# Patient Record
Sex: MALE | Race: WHITE | NOT HISPANIC OR LATINO
[De-identification: names, ages, dates, MRNs, and addresses within clinical notes are randomized per-mention and may not be internally consistent; named-entity substitution may affect disease eponyms.]

---

## 2018-04-27 PROBLEM — Z00.00 ENCOUNTER FOR PREVENTIVE HEALTH EXAMINATION: Status: ACTIVE | Noted: 2018-04-27

## 2018-05-09 ENCOUNTER — APPOINTMENT (OUTPATIENT)
Dept: ORTHOPEDIC SURGERY | Facility: CLINIC | Age: 53
End: 2018-05-09

## 2018-05-14 ENCOUNTER — APPOINTMENT (OUTPATIENT)
Dept: ORTHOPEDIC SURGERY | Facility: CLINIC | Age: 53
End: 2018-05-14
Payer: COMMERCIAL

## 2018-05-14 VITALS — HEIGHT: 71 IN | BODY MASS INDEX: 26.6 KG/M2 | WEIGHT: 190 LBS

## 2018-05-14 DIAGNOSIS — Z86.39 PERSONAL HISTORY OF OTHER ENDOCRINE, NUTRITIONAL AND METABOLIC DISEASE: ICD-10-CM

## 2018-05-14 DIAGNOSIS — Z78.9 OTHER SPECIFIED HEALTH STATUS: ICD-10-CM

## 2018-05-14 PROCEDURE — 99204 OFFICE O/P NEW MOD 45 MIN: CPT

## 2018-05-14 PROCEDURE — 73610 X-RAY EXAM OF ANKLE: CPT | Mod: RT

## 2018-07-13 ENCOUNTER — APPOINTMENT (OUTPATIENT)
Dept: ORTHOPEDIC SURGERY | Facility: CLINIC | Age: 53
End: 2018-07-13
Payer: COMMERCIAL

## 2018-07-13 ENCOUNTER — APPOINTMENT (OUTPATIENT)
Dept: VASCULAR SURGERY | Facility: CLINIC | Age: 53
End: 2018-07-13
Payer: COMMERCIAL

## 2018-07-13 VITALS
SYSTOLIC BLOOD PRESSURE: 134 MMHG | DIASTOLIC BLOOD PRESSURE: 87 MMHG | HEART RATE: 76 BPM | WEIGHT: 192 LBS | BODY MASS INDEX: 26.88 KG/M2 | HEIGHT: 71 IN

## 2018-07-13 DIAGNOSIS — I83.90 ASYMPTOMATIC VARICOSE VEINS OF UNSPECIFIED LOWER EXTREMITY: ICD-10-CM

## 2018-07-13 DIAGNOSIS — M25.871 OTHER SPECIFIED JOINT DISORDERS, RIGHT ANKLE AND FOOT: ICD-10-CM

## 2018-07-13 DIAGNOSIS — I87.2 VENOUS INSUFFICIENCY (CHRONIC) (PERIPHERAL): ICD-10-CM

## 2018-07-13 PROCEDURE — 99214 OFFICE O/P EST MOD 30 MIN: CPT

## 2018-07-13 PROCEDURE — 93970 EXTREMITY STUDY: CPT

## 2018-07-13 PROCEDURE — 99204 OFFICE O/P NEW MOD 45 MIN: CPT

## 2018-08-27 ENCOUNTER — APPOINTMENT (OUTPATIENT)
Dept: VASCULAR SURGERY | Facility: CLINIC | Age: 53
End: 2018-08-27
Payer: COMMERCIAL

## 2018-08-27 VITALS
HEIGHT: 71 IN | HEART RATE: 85 BPM | DIASTOLIC BLOOD PRESSURE: 94 MMHG | SYSTOLIC BLOOD PRESSURE: 131 MMHG | BODY MASS INDEX: 26.88 KG/M2 | WEIGHT: 192 LBS

## 2018-08-27 DIAGNOSIS — L03.115 CELLULITIS OF RIGHT LOWER LIMB: ICD-10-CM

## 2018-08-27 PROCEDURE — 99214 OFFICE O/P EST MOD 30 MIN: CPT

## 2018-08-27 PROCEDURE — 93971 EXTREMITY STUDY: CPT

## 2018-08-29 ENCOUNTER — APPOINTMENT (OUTPATIENT)
Dept: ENDOVASCULAR SURGERY | Facility: CLINIC | Age: 53
End: 2018-08-29

## 2018-08-31 ENCOUNTER — APPOINTMENT (OUTPATIENT)
Dept: VASCULAR SURGERY | Facility: CLINIC | Age: 53
End: 2018-08-31

## 2018-10-03 ENCOUNTER — EMERGENCY (EMERGENCY)
Facility: HOSPITAL | Age: 53
LOS: 1 days | Discharge: HOME | End: 2018-10-03
Attending: EMERGENCY MEDICINE
Payer: COMMERCIAL

## 2018-10-03 VITALS
DIASTOLIC BLOOD PRESSURE: 77 MMHG | OXYGEN SATURATION: 98 % | TEMPERATURE: 99 F | HEIGHT: 71 IN | WEIGHT: 194.01 LBS | RESPIRATION RATE: 16 BRPM | SYSTOLIC BLOOD PRESSURE: 121 MMHG | HEART RATE: 90 BPM

## 2018-10-03 DIAGNOSIS — Z98.890 OTHER SPECIFIED POSTPROCEDURAL STATES: Chronic | ICD-10-CM

## 2018-10-03 LAB
ALBUMIN SERPL ELPH-MCNC: 4.2 G/DL — SIGNIFICANT CHANGE UP (ref 3.5–5)
ALP SERPL-CCNC: 96 U/L — SIGNIFICANT CHANGE UP (ref 40–120)
ALT FLD-CCNC: 29 U/L DA — SIGNIFICANT CHANGE UP (ref 10–60)
ANION GAP SERPL CALC-SCNC: 6 MMOL/L — SIGNIFICANT CHANGE UP (ref 5–17)
APPEARANCE UR: CLEAR — SIGNIFICANT CHANGE UP
APTT BLD: 31.6 SEC — SIGNIFICANT CHANGE UP (ref 27.5–37.4)
AST SERPL-CCNC: 18 U/L — SIGNIFICANT CHANGE UP (ref 10–40)
BASOPHILS # BLD AUTO: 0.1 K/UL — SIGNIFICANT CHANGE UP (ref 0–0.2)
BASOPHILS NFR BLD AUTO: 1.3 % — SIGNIFICANT CHANGE UP (ref 0–2)
BILIRUB SERPL-MCNC: 0.7 MG/DL — SIGNIFICANT CHANGE UP (ref 0.2–1.2)
BILIRUB UR-MCNC: NEGATIVE — SIGNIFICANT CHANGE UP
BUN SERPL-MCNC: 13 MG/DL — SIGNIFICANT CHANGE UP (ref 7–18)
CALCIUM SERPL-MCNC: 9.3 MG/DL — SIGNIFICANT CHANGE UP (ref 8.4–10.5)
CHLORIDE SERPL-SCNC: 102 MMOL/L — SIGNIFICANT CHANGE UP (ref 96–108)
CO2 SERPL-SCNC: 28 MMOL/L — SIGNIFICANT CHANGE UP (ref 22–31)
COLOR SPEC: YELLOW — SIGNIFICANT CHANGE UP
CREAT SERPL-MCNC: 0.75 MG/DL — SIGNIFICANT CHANGE UP (ref 0.5–1.3)
DIFF PNL FLD: NEGATIVE — SIGNIFICANT CHANGE UP
EOSINOPHIL # BLD AUTO: 0.2 K/UL — SIGNIFICANT CHANGE UP (ref 0–0.5)
EOSINOPHIL NFR BLD AUTO: 2.9 % — SIGNIFICANT CHANGE UP (ref 0–6)
GLUCOSE SERPL-MCNC: 95 MG/DL — SIGNIFICANT CHANGE UP (ref 70–99)
GLUCOSE UR QL: NEGATIVE — SIGNIFICANT CHANGE UP
HCT VFR BLD CALC: 42.5 % — SIGNIFICANT CHANGE UP (ref 39–50)
HGB BLD-MCNC: 15.5 G/DL — SIGNIFICANT CHANGE UP (ref 13–17)
INR BLD: 1.01 RATIO — SIGNIFICANT CHANGE UP (ref 0.88–1.16)
KETONES UR-MCNC: NEGATIVE — SIGNIFICANT CHANGE UP
LACTATE SERPL-SCNC: 0.6 MMOL/L — LOW (ref 0.7–2)
LEUKOCYTE ESTERASE UR-ACNC: NEGATIVE — SIGNIFICANT CHANGE UP
LYMPHOCYTES # BLD AUTO: 1.1 K/UL — SIGNIFICANT CHANGE UP (ref 1–3.3)
LYMPHOCYTES # BLD AUTO: 18.4 % — SIGNIFICANT CHANGE UP (ref 13–44)
MCHC RBC-ENTMCNC: 36.5 GM/DL — HIGH (ref 32–36)
MCHC RBC-ENTMCNC: 36.7 PG — HIGH (ref 27–34)
MCV RBC AUTO: 100.6 FL — HIGH (ref 80–100)
MONOCYTES # BLD AUTO: 0.6 K/UL — SIGNIFICANT CHANGE UP (ref 0–0.9)
MONOCYTES NFR BLD AUTO: 10.5 % — SIGNIFICANT CHANGE UP (ref 2–14)
NEUTROPHILS # BLD AUTO: 4.1 K/UL — SIGNIFICANT CHANGE UP (ref 1.8–7.4)
NEUTROPHILS NFR BLD AUTO: 66.9 % — SIGNIFICANT CHANGE UP (ref 43–77)
NITRITE UR-MCNC: NEGATIVE — SIGNIFICANT CHANGE UP
PH UR: 7 — SIGNIFICANT CHANGE UP (ref 5–8)
PLAT MORPH BLD: NORMAL — SIGNIFICANT CHANGE UP
PLATELET # BLD AUTO: 248 K/UL — SIGNIFICANT CHANGE UP (ref 150–400)
POTASSIUM SERPL-MCNC: 3.8 MMOL/L — SIGNIFICANT CHANGE UP (ref 3.5–5.3)
POTASSIUM SERPL-SCNC: 3.8 MMOL/L — SIGNIFICANT CHANGE UP (ref 3.5–5.3)
PROT SERPL-MCNC: 7.8 G/DL — SIGNIFICANT CHANGE UP (ref 6–8.3)
PROT UR-MCNC: NEGATIVE — SIGNIFICANT CHANGE UP
PROTHROM AB SERPL-ACNC: 11 SEC — SIGNIFICANT CHANGE UP (ref 9.8–12.7)
RBC # BLD: 4.22 M/UL — SIGNIFICANT CHANGE UP (ref 4.2–5.8)
RBC # FLD: 11.8 % — SIGNIFICANT CHANGE UP (ref 10.3–14.5)
RBC BLD AUTO: NORMAL — SIGNIFICANT CHANGE UP
SODIUM SERPL-SCNC: 136 MMOL/L — SIGNIFICANT CHANGE UP (ref 135–145)
SP GR SPEC: 1 — LOW (ref 1.01–1.02)
UROBILINOGEN FLD QL: NEGATIVE — SIGNIFICANT CHANGE UP
WBC # BLD: 6.1 K/UL — SIGNIFICANT CHANGE UP (ref 3.8–10.5)
WBC # FLD AUTO: 6.1 K/UL — SIGNIFICANT CHANGE UP (ref 3.8–10.5)

## 2018-10-03 PROCEDURE — 85027 COMPLETE CBC AUTOMATED: CPT

## 2018-10-03 PROCEDURE — 87040 BLOOD CULTURE FOR BACTERIA: CPT

## 2018-10-03 PROCEDURE — 93971 EXTREMITY STUDY: CPT | Mod: 26,RT

## 2018-10-03 PROCEDURE — 99285 EMERGENCY DEPT VISIT HI MDM: CPT

## 2018-10-03 PROCEDURE — 87086 URINE CULTURE/COLONY COUNT: CPT

## 2018-10-03 PROCEDURE — 85730 THROMBOPLASTIN TIME PARTIAL: CPT

## 2018-10-03 PROCEDURE — 93971 EXTREMITY STUDY: CPT

## 2018-10-03 PROCEDURE — 80053 COMPREHEN METABOLIC PANEL: CPT

## 2018-10-03 PROCEDURE — 81003 URINALYSIS AUTO W/O SCOPE: CPT

## 2018-10-03 PROCEDURE — 96374 THER/PROPH/DIAG INJ IV PUSH: CPT

## 2018-10-03 PROCEDURE — 99284 EMERGENCY DEPT VISIT MOD MDM: CPT | Mod: 25

## 2018-10-03 PROCEDURE — 83605 ASSAY OF LACTIC ACID: CPT

## 2018-10-03 PROCEDURE — 85610 PROTHROMBIN TIME: CPT

## 2018-10-03 PROCEDURE — 36415 COLL VENOUS BLD VENIPUNCTURE: CPT

## 2018-10-03 RX ORDER — RIVAROXABAN 15 MG-20MG
15 KIT ORAL
Qty: 0 | Refills: 0 | Status: DISCONTINUED | OUTPATIENT
Start: 2018-10-03 | End: 2018-10-07

## 2018-10-03 RX ORDER — CEFAZOLIN SODIUM 1 G
1000 VIAL (EA) INJECTION ONCE
Qty: 0 | Refills: 0 | Status: COMPLETED | OUTPATIENT
Start: 2018-10-03 | End: 2018-10-03

## 2018-10-03 RX ORDER — SODIUM CHLORIDE 9 MG/ML
1000 INJECTION INTRAMUSCULAR; INTRAVENOUS; SUBCUTANEOUS
Qty: 0 | Refills: 0 | Status: DISCONTINUED | OUTPATIENT
Start: 2018-10-03 | End: 2018-10-07

## 2018-10-03 RX ORDER — RIVAROXABAN 15 MG-20MG
1 KIT ORAL
Qty: 42 | Refills: 0 | OUTPATIENT
Start: 2018-10-03 | End: 2018-10-23

## 2018-10-03 RX ORDER — KETOROLAC TROMETHAMINE 30 MG/ML
30 SYRINGE (ML) INJECTION ONCE
Qty: 0 | Refills: 0 | Status: DISCONTINUED | OUTPATIENT
Start: 2018-10-03 | End: 2018-10-03

## 2018-10-03 RX ADMIN — Medication 100 MILLIGRAM(S): at 12:22

## 2018-10-03 RX ADMIN — SODIUM CHLORIDE 125 MILLILITER(S): 9 INJECTION INTRAMUSCULAR; INTRAVENOUS; SUBCUTANEOUS at 12:22

## 2018-10-03 NOTE — CONSULT NOTE ADULT - ASSESSMENT
thrombophlebitis, not improved weeks, appears progressive superficially. no dvt per duplex.      Suggest Xarelto 15mg bid [id number] the  20mg daily thereafter.  Return to ER if fever or worsening erythema or pain.  f/u with Vasc Surgeon in office Monday or Friday per schedule.

## 2018-10-03 NOTE — ED ADULT NURSE NOTE - NSIMPLEMENTINTERV_GEN_ALL_ED
Implemented All Universal Safety Interventions:  [place] to call system. Call bell, personal items and telephone within reach. Instruct patient to call for assistance. Room bathroom lighting operational. Non-slip footwear when patient is off stretcher. Physically safe environment: no spills, clutter or unnecessary equipment. Stretcher in lowest position, wheels locked, appropriate side rails in place.

## 2018-10-03 NOTE — ED ADULT NURSE NOTE - OBJECTIVE STATEMENT
I have phlebitis in my right leg and its spreading up my leg. I am scheduled for varicose vein surgery later this month. I was scheduled in august for the surgery but they had to cancel it as I had phlebitis then"

## 2018-10-03 NOTE — ED PROVIDER NOTE - PHYSICAL EXAMINATION
RLE- mild edema, diffuse varicose veins lower ext below knee, superficial veins noted, med aspect thigh- erythematous, warm, tenderness to palp., no crepitus RLE- mild edema, diffuse varicose veins lower ext below knee, superficial veins noted, med aspect thigh- erythematous, warm, tenderness to palp., cord palp., no crepitus

## 2018-10-03 NOTE — ED ADULT TRIAGE NOTE - CHIEF COMPLAINT QUOTE
C/o " I have phlebitis in my right leg and its spreading up my leg. I am scheduled for varicose vein surgery later this month. I was scheduled in august for the surgery but they had to cancel it as I had phlebitis the too". Vascular surgeon Gilberto West. C/o " I have phlebitis in my right leg and its spreading up my leg. I am scheduled for varicose vein surgery later this month. I was scheduled in august for the surgery but they had to cancel it as I had phlebitis then". Vascular surgeon Gilberto West.

## 2018-10-03 NOTE — CONSULT NOTE ADULT - SUBJECTIVE AND OBJECTIVE BOX
54 yo male pmhx phlebitis RLE month ago. pt was scheduled for vein stripping but postponed for treatment of phlebitis. Pt now scheduled for late october surgical date.   to ER c/o erythema pain induration to upper thigh. no fever no paresthesias. no hx trauma. no open lesions.  no distal extremity weakness.    ICU Vital Signs Last 24 Hrs  T(C): 37.1 (03 Oct 2018 10:26), Max: 37.1 (03 Oct 2018 10:26)  T(F): 98.7 (03 Oct 2018 10:26), Max: 98.7 (03 Oct 2018 10:26)  HR: 90 (03 Oct 2018 10:26) (90 - 90)  BP: 121/77 (03 Oct 2018 10:26) (121/77 - 121/77)  BP(mean): --  ABP: --  ABP(mean): --  RR: 16 (03 Oct 2018 10:26) (16 - 16)  SpO2: 98% (03 Oct 2018 10:26) (98% - 98%)    Right Leg to upper medial thigh, palpable cord, mild tenderness, indurated. mild surrounding erythema to mid thigh. no crepitus. no fluctuance.  Extremity warm, distal pulses intact.                          15.5   6.1   )-----------( 248      ( 03 Oct 2018 12:35 )             42.5     10-03    136  |  102  |  13  ----------------------------<  95  3.8   |  28  |  0.75    Ca    9.3      03 Oct 2018 12:35    TPro  7.8  /  Alb  4.2  /  TBili  0.7  /  DBili  x   /  AST  18  /  ALT  29  /  AlkPhos  96  10-03      < from: US Duplex Venous Lower Ext Ltd, Right (10.03.18 @ 12:10) >  IMPRESSION:     No evidence of right lower extremity deep venous thrombosis.    Superficial thrombophlebitis in the right greater saphenous vein.    < end of copied text >

## 2018-10-03 NOTE — ED ADULT NURSE NOTE - CHIEF COMPLAINT QUOTE
C/o " I have phlebitis in my right leg and its spreading up my leg. I am scheduled for varicose vein surgery later this month. I was scheduled in august for the surgery but they had to cancel it as I had phlebitis then". Vascular surgeon Gilberto West.

## 2018-10-04 LAB
CULTURE RESULTS: NO GROWTH — SIGNIFICANT CHANGE UP
SPECIMEN SOURCE: SIGNIFICANT CHANGE UP

## 2018-10-08 ENCOUNTER — APPOINTMENT (OUTPATIENT)
Dept: VASCULAR SURGERY | Facility: CLINIC | Age: 53
End: 2018-10-08

## 2018-10-08 ENCOUNTER — APPOINTMENT (OUTPATIENT)
Dept: VASCULAR SURGERY | Facility: CLINIC | Age: 53
End: 2018-10-08
Payer: COMMERCIAL

## 2018-10-08 ENCOUNTER — APPOINTMENT (OUTPATIENT)
Age: 53
End: 2018-10-08
Payer: COMMERCIAL

## 2018-10-08 DIAGNOSIS — I82.811 EMBOLISM AND THROMBOSIS OF SUPERFICIAL VEINS OF RIGHT LOWER EXTREMITY: ICD-10-CM

## 2018-10-08 LAB
CULTURE RESULTS: SIGNIFICANT CHANGE UP
CULTURE RESULTS: SIGNIFICANT CHANGE UP
SPECIMEN SOURCE: SIGNIFICANT CHANGE UP
SPECIMEN SOURCE: SIGNIFICANT CHANGE UP

## 2018-10-08 PROCEDURE — 93971 EXTREMITY STUDY: CPT

## 2018-10-08 PROCEDURE — 99214 OFFICE O/P EST MOD 30 MIN: CPT

## 2018-10-15 ENCOUNTER — APPOINTMENT (OUTPATIENT)
Dept: VASCULAR SURGERY | Facility: CLINIC | Age: 53
End: 2018-10-15
Payer: COMMERCIAL

## 2018-10-15 PROCEDURE — 93971 EXTREMITY STUDY: CPT

## 2018-10-15 PROCEDURE — 99213 OFFICE O/P EST LOW 20 MIN: CPT

## 2018-10-31 ENCOUNTER — APPOINTMENT (OUTPATIENT)
Dept: ENDOVASCULAR SURGERY | Facility: CLINIC | Age: 53
End: 2018-10-31

## 2018-11-02 ENCOUNTER — APPOINTMENT (OUTPATIENT)
Dept: VASCULAR SURGERY | Facility: CLINIC | Age: 53
End: 2018-11-02

## 2018-11-12 ENCOUNTER — APPOINTMENT (OUTPATIENT)
Dept: VASCULAR SURGERY | Facility: CLINIC | Age: 53
End: 2018-11-12
Payer: COMMERCIAL

## 2018-11-12 PROCEDURE — 99214 OFFICE O/P EST MOD 30 MIN: CPT

## 2018-11-12 PROCEDURE — 93971 EXTREMITY STUDY: CPT

## 2019-01-28 ENCOUNTER — APPOINTMENT (OUTPATIENT)
Dept: VASCULAR SURGERY | Facility: CLINIC | Age: 54
End: 2019-01-28
Payer: COMMERCIAL

## 2019-01-28 VITALS
BODY MASS INDEX: 26.88 KG/M2 | DIASTOLIC BLOOD PRESSURE: 92 MMHG | HEART RATE: 73 BPM | HEIGHT: 71 IN | SYSTOLIC BLOOD PRESSURE: 134 MMHG | WEIGHT: 192 LBS

## 2019-01-28 PROCEDURE — 93971 EXTREMITY STUDY: CPT

## 2019-01-28 PROCEDURE — 99214 OFFICE O/P EST MOD 30 MIN: CPT

## 2019-01-28 RX ORDER — CEPHALEXIN 500 MG/1
500 CAPSULE ORAL
Qty: 40 | Refills: 0 | Status: COMPLETED | COMMUNITY
Start: 2018-08-21 | End: 2019-01-28

## 2019-01-28 NOTE — PHYSICAL EXAM
[JVD] : no jugular venous distention  [Normal Breath Sounds] : Normal breath sounds [Normal Heart Sounds] : normal heart sounds [2+] : left 2+ [Ankle Swelling (On Exam)] : not present [Varicose Veins Of Lower Extremities] : present [Varicose Veins Of The Right Leg] : of the right leg [Ankle Swelling On The Left] : moderate [] : not present [Abdomen Masses] : No abdominal masses [No Rash or Lesion] : No rash or lesion [Skin Ulcer] : no ulcer [Alert] : alert [Oriented to Place] : oriented to place [Calm] : calm [de-identified] : appears well [de-identified] : motor intact b/l lower extremities [de-identified] : erythema over the medial inner thigh along the course of the gsv

## 2019-01-28 NOTE — HISTORY OF PRESENT ILLNESS
[FreeTextEntry1] : 54 yo male with history of varicose veins and venous insufficency scheduled for ablation and phlebectomy that was canceled 2/2 svt of the right lower extremity.  pt s was started on xarelto

## 2019-01-28 NOTE — ASSESSMENT
[FreeTextEntry1] : 54 yo male with history of varicose veins and venous insufficency scheduled for ablation and phlebectomy that was canceled 2/2 svt of the right lower extremity now with worsening symptoms found to have propigation of the svt to the proximal thigh.  pt started on xarelto\par repeat duplex shows no extension, Phlebitis is chronic now except small varicosity in the calf with acute thrombus. We'll hold anticoagulation. Start aspirin. Follow up in 2 weeks for repeat duplex\par

## 2019-02-11 ENCOUNTER — APPOINTMENT (OUTPATIENT)
Dept: VASCULAR SURGERY | Facility: CLINIC | Age: 54
End: 2019-02-11
Payer: COMMERCIAL

## 2019-02-11 VITALS
HEART RATE: 64 BPM | DIASTOLIC BLOOD PRESSURE: 84 MMHG | SYSTOLIC BLOOD PRESSURE: 120 MMHG | WEIGHT: 192 LBS | HEIGHT: 71 IN | BODY MASS INDEX: 26.88 KG/M2

## 2019-02-11 PROCEDURE — 93971 EXTREMITY STUDY: CPT

## 2019-02-11 PROCEDURE — 99214 OFFICE O/P EST MOD 30 MIN: CPT

## 2019-02-11 RX ORDER — ASPIRIN 81 MG
81 TABLET, DELAYED RELEASE (ENTERIC COATED) ORAL
Refills: 0 | Status: ACTIVE | COMMUNITY

## 2019-02-11 RX ORDER — PREDNISONE 10 MG/1
10 TABLET ORAL
Qty: 15 | Refills: 0 | Status: DISCONTINUED | COMMUNITY
Start: 2018-08-20 | End: 2019-02-11

## 2019-02-11 RX ORDER — DICLOFENAC SODIUM 50 MG/1
50 TABLET, DELAYED RELEASE ORAL
Qty: 60 | Refills: 1 | Status: DISCONTINUED | COMMUNITY
Start: 2018-07-13 | End: 2019-02-11

## 2019-02-11 RX ORDER — RIVAROXABAN 20 MG/1
20 TABLET, FILM COATED ORAL
Qty: 60 | Refills: 1 | Status: DISCONTINUED | COMMUNITY
Start: 2018-10-08 | End: 2019-02-11

## 2019-02-11 RX ORDER — CLOBETASOL PROPIONATE 0.5 MG/G
0.05 CREAM TOPICAL
Qty: 60 | Refills: 0 | Status: DISCONTINUED | COMMUNITY
Start: 2018-08-20 | End: 2019-02-11

## 2019-02-11 RX ORDER — TADALAFIL 20 MG/1
20 TABLET, FILM COATED ORAL
Qty: 10 | Refills: 0 | Status: DISCONTINUED | COMMUNITY
Start: 2018-07-09 | End: 2019-02-11

## 2019-02-15 NOTE — ASSESSMENT
[FreeTextEntry1] : 52 yo male with history of varicose veins and venous insufficency scheduled for ablation and phlebectomy that was canceled 2/2 svt of the right lower extremity presents for follow up with improvement in his pain currently off the xarelto\par duplex shows chronic svt of the gsv with partial recanalization with subacute thrombus in the vv of the mid calf\par will continue with asa 81 mg and pt informed that he can wear his compression stockings and will follow up in 6-8 weeks with repeat duplex

## 2019-02-15 NOTE — HISTORY OF PRESENT ILLNESS
[FreeTextEntry1] : 54 yo male with history of varicose veins and venous insufficency scheduled for ablation and phlebectomy that was canceled 2/2 svt of the right lower extremity presents for follow up \par pt states that the pain has resolved over the past 2 weeks.  pt denies any cp/sob or difficulty walking \par he has been off the xarelto and has been taking asa 81 mg daily

## 2019-02-15 NOTE — PHYSICAL EXAM
[Varicose Veins Of Lower Extremities] : present [Varicose Veins Of The Right Leg] : of the right leg [] : of the right leg [Ankle Swelling On The Left] : moderate [No Rash or Lesion] : No rash or lesion [Alert] : alert [Calm] : calm [JVD] : no jugular venous distention  [Normal Breath Sounds] : Normal breath sounds [Normal Heart Sounds] : normal heart sounds [Ankle Swelling (On Exam)] : not present [Abdomen Masses] : No abdominal masses [Skin Ulcer] : no ulcer [de-identified] : appears well [de-identified] : no calf tenderness, no palpable cords

## 2019-04-08 ENCOUNTER — APPOINTMENT (OUTPATIENT)
Dept: VASCULAR SURGERY | Facility: CLINIC | Age: 54
End: 2019-04-08
Payer: COMMERCIAL

## 2019-04-08 PROCEDURE — 93971 EXTREMITY STUDY: CPT

## 2019-04-08 PROCEDURE — 99214 OFFICE O/P EST MOD 30 MIN: CPT

## 2019-04-08 NOTE — PHYSICAL EXAM
[JVD] : no jugular venous distention  [Normal Breath Sounds] : Normal breath sounds [Normal Heart Sounds] : normal heart sounds [Ankle Swelling (On Exam)] : not present [Varicose Veins Of Lower Extremities] : present [Varicose Veins Of The Right Leg] : of the right leg [] : of the right leg [Ankle Swelling On The Left] : moderate [Abdomen Masses] : No abdominal masses [No Rash or Lesion] : No rash or lesion [Skin Ulcer] : no ulcer [Alert] : alert [Calm] : calm [de-identified] : appears well [de-identified] : no calf tenderness, no palpable cords

## 2019-04-08 NOTE — ASSESSMENT
[FreeTextEntry1] : 52 yo male with history of varicose veins and venous insufficency scheduled for ablation and phlebectomy that was canceled 2/2 svt of the right lower extremity presents for follow up with improvement in his pain currently off the xarelto\par duplex shows chronic svt of the gsv with partial recanalization with chronic thrombus in the vv of the mid calf\par will continue with asa 81 mg and pt informed that he can wear his compression stockings\par \par Followup p.r.n. Will hold off any further procedures unless the patient becomes symptomatic

## 2019-04-08 NOTE — HISTORY OF PRESENT ILLNESS
[FreeTextEntry1] : 54 yo male with history of varicose veins and venous insufficency scheduled for ablation and phlebectomy that was canceled 2/2 svt of the right lower extremity presents for follow up \par pt states that the pain has resolved over the past 10 weeks.  pt denies any cp/sob or difficulty walking \par he has been off the xarelto and has been taking asa 81 mg daily

## 2020-09-04 NOTE — ED PROVIDER NOTE - OBJECTIVE STATEMENT
She should not use oxycodone and clonazepam together  Stick w/ the advil and tylenol  53 y.o. male c/o Rt thigh redness for 1 week, pain, pt with h/o Phlebitis RLE, Rt ankle swelling for 6 mos., no fever, sob, recent travelling, injury, pt took Diclofenac with relief.  Pt is for varicose vein stripping 0n 10/31

## 2021-08-16 ENCOUNTER — APPOINTMENT (OUTPATIENT)
Dept: VASCULAR SURGERY | Facility: CLINIC | Age: 56
End: 2021-08-16
Payer: COMMERCIAL

## 2021-08-16 VITALS — DIASTOLIC BLOOD PRESSURE: 88 MMHG | HEART RATE: 76 BPM | SYSTOLIC BLOOD PRESSURE: 132 MMHG

## 2021-08-16 PROCEDURE — 93971 EXTREMITY STUDY: CPT

## 2021-08-16 PROCEDURE — 99213 OFFICE O/P EST LOW 20 MIN: CPT

## 2021-08-18 NOTE — HISTORY OF PRESENT ILLNESS
[FreeTextEntry1] : 55 yo male with history of varicose veins and venous insufficiency scheduled for ablation and phlebectomy that was canceled 2/2 svt of the right lower extremity presents for follow up \par pt states that he had been doing well after d/c'ed a/c he noted about 4-5 weeks ago inflammation and tender cords over the right medial calf \par pt also noted symptoms started about a week or 2 after receiving the [place] and [place] vaccine \par \par pt denies any history of cp/sob \par \par pt states that he has not been taking asa daily and that he recently starting wearing his compression stockings with only some relief.

## 2021-08-18 NOTE — ASSESSMENT
[FreeTextEntry1] : 55 yo male with history of varicose veins and venous insufficiency scheduled for ablation and phlebectomy that was canceled 2/2 svt of the right lower extremity presents for follow up \par \par duplex shows subacute svt about 2.6 cm from the junction with chronic svt throughout the gsv\par would recommend restarting asa 81 mg daily \par \par pt to follow up in 1 week to evaluate for any propagation \par

## 2021-08-18 NOTE — PHYSICAL EXAM
[2+] : left 2+ [Ankle Swelling (On Exam)] : present [Ankle Swelling On The Left] : moderate [Varicose Veins Of Lower Extremities] : bilaterally [Ankle Swelling Bilaterally] : severe [No Rash or Lesion] : No rash or lesion [Alert] : alert [Calm] : calm [JVD] : no jugular venous distention  [Normal Breath Sounds] : Normal breath sounds [Normal Heart Sounds] : normal heart sounds [] : not present [Abdomen Masses] : No abdominal masses [Skin Ulcer] : no ulcer [de-identified] : appears well  [de-identified] : palpable tender cords of the right calf

## 2021-08-25 ENCOUNTER — APPOINTMENT (OUTPATIENT)
Dept: VASCULAR SURGERY | Facility: CLINIC | Age: 56
End: 2021-08-25
Payer: COMMERCIAL

## 2021-08-25 PROCEDURE — 93971 EXTREMITY STUDY: CPT

## 2021-10-11 ENCOUNTER — APPOINTMENT (OUTPATIENT)
Dept: VASCULAR SURGERY | Facility: CLINIC | Age: 56
End: 2021-10-11
Payer: COMMERCIAL

## 2021-10-11 PROCEDURE — 99213 OFFICE O/P EST LOW 20 MIN: CPT

## 2021-10-11 PROCEDURE — 93971 EXTREMITY STUDY: CPT

## 2021-10-11 NOTE — HISTORY OF PRESENT ILLNESS
[FreeTextEntry1] : 57 yo male with history of varicose veins and venous insufficiency scheduled for ablation and phlebectomy that was canceled 2/2 svt of the right lower extremity presents for follow up \par \par pt also noted symptoms started about a week or 2 after receiving the [place] and [place] vaccine \par \par pt denies any history of cp/sob \par \par pt states that he has been taking asa daily and that he recently starting wearing his compression stockings

## 2021-10-11 NOTE — PHYSICAL EXAM
[JVD] : no jugular venous distention  [Normal Breath Sounds] : Normal breath sounds [Normal Heart Sounds] : normal heart sounds [2+] : left 2+ [Ankle Swelling (On Exam)] : present [Ankle Swelling On The Left] : moderate [Varicose Veins Of Lower Extremities] : bilaterally [Ankle Swelling Bilaterally] : severe [] : not present [Abdomen Masses] : No abdominal masses [No Rash or Lesion] : No rash or lesion [Skin Ulcer] : no ulcer [Alert] : alert [Calm] : calm [de-identified] : appears well  [de-identified] : palpable tender cords of the right calf

## 2021-10-11 NOTE — ASSESSMENT
[FreeTextEntry1] : 57 yo male with history of varicose veins and venous insufficiency scheduled for ablation and phlebectomy that was canceled 2/2 svt of the right lower extremity presents for follow up \par \par Repeat duplex shows chronic SVT with no evidence of acute thrombus.  Continue aspirin.  Recommend compression stockings and elevation.  Follow-up as needed.

## 2023-09-11 ENCOUNTER — APPOINTMENT (OUTPATIENT)
Dept: VASCULAR SURGERY | Facility: CLINIC | Age: 58
End: 2023-09-11

## 2023-09-11 ENCOUNTER — APPOINTMENT (OUTPATIENT)
Dept: VASCULAR SURGERY | Facility: CLINIC | Age: 58
End: 2023-09-11
Payer: COMMERCIAL

## 2023-09-11 PROCEDURE — 99214 OFFICE O/P EST MOD 30 MIN: CPT

## 2023-10-04 ENCOUNTER — APPOINTMENT (OUTPATIENT)
Dept: VASCULAR SURGERY | Facility: CLINIC | Age: 58
End: 2023-10-04
Payer: COMMERCIAL

## 2023-10-04 PROCEDURE — 93971 EXTREMITY STUDY: CPT | Mod: RT

## 2025-02-23 ENCOUNTER — EMERGENCY (EMERGENCY)
Facility: HOSPITAL | Age: 60
LOS: 1 days | Discharge: HOME | End: 2025-02-23
Attending: EMERGENCY MEDICINE
Payer: COMMERCIAL

## 2025-02-23 VITALS
RESPIRATION RATE: 17 BRPM | HEIGHT: 72 IN | WEIGHT: 195.11 LBS | OXYGEN SATURATION: 99 % | DIASTOLIC BLOOD PRESSURE: 82 MMHG | SYSTOLIC BLOOD PRESSURE: 138 MMHG | HEART RATE: 69 BPM | TEMPERATURE: 98 F

## 2025-02-23 DIAGNOSIS — Z98.890 OTHER SPECIFIED POSTPROCEDURAL STATES: Chronic | ICD-10-CM

## 2025-02-23 PROCEDURE — 99283 EMERGENCY DEPT VISIT LOW MDM: CPT | Mod: 25

## 2025-02-23 PROCEDURE — 99284 EMERGENCY DEPT VISIT MOD MDM: CPT

## 2025-02-23 PROCEDURE — 73130 X-RAY EXAM OF HAND: CPT

## 2025-02-23 PROCEDURE — 73130 X-RAY EXAM OF HAND: CPT | Mod: 26,RT

## 2025-02-23 RX ORDER — IBUPROFEN 600 MG/1
1 TABLET, FILM COATED ORAL
Qty: 30 | Refills: 0
Start: 2025-02-23

## 2025-02-23 NOTE — ED PROVIDER NOTE - PATIENT PORTAL LINK FT
You can access the FollowMyHealth Patient Portal offered by [facility] by registering at the following website: [email or web address]. By joining [employer]’s FollowMyHealth portal, you will also be able to view your health information using other applications (apps) compatible with our system.

## 2025-02-23 NOTE — ED PROVIDER NOTE - NSFOLLOWUPINSTRUCTIONS_ED_ALL_ED_FT
please follow-up with your doctor within 1 week.  Please follow with a hand specialist within 1 week.  Please return immediately anything changes or worsens you have new concerns.  You try icing it  raising it above your heart and taking Motrin for the pain

## 2025-02-23 NOTE — ED PROVIDER NOTE - PHYSICAL EXAMINATION
right hand swollen along the dorsal aspect near the base of the thumb.  There is no bony tenderness of the 5 metacarpals, wrist bones.  He is got good strength of his digits good cap refill good radial pulses and appears to no deficit in sensation.  Is not erythematous or ecchymotic or hot to touch

## 2025-02-23 NOTE — ED ADULT NURSE NOTE - NSFALLUNIVINTERV_ED_ALL_ED
Bed/Stretcher in lowest position, wheels locked, appropriate side rails in place/Call bell, personal items and telephone in reach/Instruct patient to call for assistance before getting out of bed/chair/stretcher/Non-slip footwear applied when patient is off stretcher/[place] to call system/Physically safe environment - no spills, clutter or unnecessary equipment/Purposeful proactive rounding/Room/bathroom lighting operational, light cord in reach

## 2025-02-23 NOTE — ED PROVIDER NOTE - OBJECTIVE STATEMENT
60-year-old male presents with his wife and daughter for evaluation of right hand swelling and pain.  Noted that yesterday when he woke up no known trauma no injury no intoxication and falling asleep on it no drug use etc.  The pain is predominantly  located on the  lateral aspect dorsal surface of the right hand right-hand-dominant male.  He did not fall injury or break in the skin etc. he is a nondrinker nondrug user  He reports having poor circulation once waking up with his foot swollen to go off vertigo where he had a limp.   no recent travel

## 2025-02-23 NOTE — ED PROVIDER NOTE - CLINICAL SUMMARY MEDICAL DECISION MAKING FREE TEXT BOX
right hand swelling nontraumatic and right-hand-dominant male x-ray pursued for acute fracture none noted.  Uncertain cause of the swelling is no signs to suggest a DVT of his upper extremity his compartments are soft proximally is good pulses proximally and distally and there is no other swelling noted.  Plus he had no previous history of such.  I recommend he follows up with a hand specialist in a week as I do not know the cause of his hand swelling as well as follow-up with his primary care doctor within 1 week

## 2025-03-04 ENCOUNTER — APPOINTMENT (OUTPATIENT)
Dept: ORTHOPEDIC SURGERY | Age: 60
End: 2025-03-04
Payer: COMMERCIAL

## 2025-03-04 VITALS — HEIGHT: 72 IN | BODY MASS INDEX: 26.41 KG/M2 | WEIGHT: 195 LBS | RESPIRATION RATE: 16 BRPM

## 2025-03-04 DIAGNOSIS — M25.531 PAIN IN RIGHT WRIST: ICD-10-CM

## 2025-03-04 PROCEDURE — 99204 OFFICE O/P NEW MOD 45 MIN: CPT

## 2025-03-20 ENCOUNTER — APPOINTMENT (OUTPATIENT)
Dept: ORTHOPEDIC SURGERY | Age: 60
End: 2025-03-20

## 2025-03-20 DIAGNOSIS — M25.531 PAIN IN RIGHT WRIST: ICD-10-CM

## 2025-03-20 PROCEDURE — 99214 OFFICE O/P EST MOD 30 MIN: CPT | Mod: 25

## 2025-03-20 PROCEDURE — 20600 DRAIN/INJ JOINT/BURSA W/O US: CPT | Mod: RT

## 2025-03-27 ENCOUNTER — TRANSCRIPTION ENCOUNTER (OUTPATIENT)
Age: 60
End: 2025-03-27